# Patient Record
Sex: FEMALE | Race: WHITE | URBAN - METROPOLITAN AREA
[De-identification: names, ages, dates, MRNs, and addresses within clinical notes are randomized per-mention and may not be internally consistent; named-entity substitution may affect disease eponyms.]

---

## 2017-06-21 ENCOUNTER — THERAPY VISIT (OUTPATIENT)
Dept: PHYSICAL THERAPY | Facility: CLINIC | Age: 52
End: 2017-06-21
Payer: COMMERCIAL

## 2017-06-21 DIAGNOSIS — M54.12 CERVICAL RADICULOPATHY: Primary | ICD-10-CM

## 2017-06-21 PROCEDURE — 97110 THERAPEUTIC EXERCISES: CPT | Mod: GP | Performed by: PHYSICAL THERAPIST

## 2017-06-21 PROCEDURE — 97161 PT EVAL LOW COMPLEX 20 MIN: CPT | Mod: GP | Performed by: PHYSICAL THERAPIST

## 2017-06-21 PROCEDURE — 97140 MANUAL THERAPY 1/> REGIONS: CPT | Mod: GP | Performed by: PHYSICAL THERAPIST

## 2017-06-21 NOTE — MR AVS SNAPSHOT
After Visit Summary   6/21/2017    Zulma Stewart    MRN: [id number]           Patient Information     Date Of Birth          1965        Visit Information        Provider Department      6/21/2017 7:30 AM Ralph De Souza PT [facility]        Today's Diagnoses     Cervical radiculopathy    -  1       Follow-ups after your visit        Your next 10 appointments already scheduled     Jun 27, 2017  4:40 PM CDT   JAGDISH Spine with Ralph De Souza PT   [place] ([facility]  )    [address] [place] [address]   [phone]            Jun 29, 2017  7:30 AM CDT   JAGDISH Spine with Ralph De Souza PT   [facility] ([facility]  )    [address] [place] [address]   [phone]            Jul 03, 2017  7:30 AM CDT   JAGDISH Spine with Rosaura Arriaga PT   [facility] ([facility]  )    [address] [place] [address]   [phone]            Jul 06, 2017  8:10 AM CDT   JAGDISH Spine with Rosaura Arriaga PT   [facility] ([facility]  )    [address] [place] [address]   [phone]            Jul 11, 2017  7:30 AM CDT   JAGDISH Spine with Ralph De Souza PT   [facility] ([facility]  )    [address] [place] [address]   [phone]            Jul 13, 2017  7:30 AM CDT   JAGDISH Spine with Ralph De Souza PT   [facility] ([facility]  )    [address] [place] [address]   [phone]            Jul 18, 2017  7:30 AM CDT   JAGDISH Spine with Ralph De Souza PT   [facility] ([facility]  )    [address] [place] [address]  "[facility] [id number] [phone]            2017  7:30 AM CDT   JAGDISH Spine with Ralph De Souza PT   [facility] Therapy ([facility]  )    [address] [facility] [address] [phone]              Who to contact     If you have questions or need follow up information about today's clinic visit or your schedule please [facility] directly at [phone].  Normal or non-critical lab and imaging results will be communicated to you by [employer], letter or phone within 4 business days after the clinic has received the results. If you do not hear from us within 7 days, please contact the clinic through [employer] or phone. If you have a critical or abnormal lab result, we will notify you by phone as soon as possible.  Submit refill requests through [email or web address] or call your pharmacy and they will forward the refill request to us. Please allow 3 business days for your refill to be completed.          Additional Information About Your Visit        MyChart Information     [email or web address] lets you send messages to your doctor, view your test results, renew your prescriptions, schedule appointments and more. To sign up, go to [email or web address] . Click on \"Log in\" on the left side of the screen, which will take you to the Welcome page. Then click on \"Sign up Now\" on the right side of the page.     You will be asked to enter the access code listed below, as well as some personal information. Please follow the directions to create your username and password.     Your access code is: [email or web address]  Expires: 2017  8:36 AM     Your access code will  in 90 days. If you need help or a new code, please call your [facility] clinic or [phone].        Care EveryWhere ID     This is your Care EveryWhere ID. This could be used by other organizations to access your [facility] medical " records  [id number]         Blood Pressure from Last 3 Encounters:   01/14/12 92/58   11/11/11 102/60   05/05/11 116/60    Weight from Last 3 Encounters:   01/14/12 68.2 kg (150 lb 6 oz)   11/11/11 65.9 kg (145 lb 3.2 oz)              We Performed the Following     HC PT EVAL, LOW COMPLEXITY     JAGDISH INITIAL EVAL REPORT     MANUAL THER TECH,1+REGIONS,EA 15 MIN     THERAPEUTIC EXERCISES        Primary Care Provider Office Phone # Fax #    Cassandra Sorto, APRN [phone]       [place] [address]        Equal Access to Services     MARIAM GRAFF : Hadii jaron egan Sopriscila, waaxda luqadaha, qaybta kaalmada adeedwinyasheela, laure gonzalez . So [facility] [phone].    ATENCIÓN: Si habla español, tiene a perez disposición servicios gratuitos de asistencia lingüística. Llame al [phone].    We comply with applicable federal civil rights laws and [place] laws. We do not discriminate on the basis of race, color, national origin, age, disability sex, sexual orientation or gender identity.            Thank you!     Thank you for choosing [facility]ICAL THERAP[facility]  for your care. Our goal is always to provide you with excellent care. Hearing back from our patients is one way we can continue to improve our services. Please take a few minutes to complete the written survey that you may receive in the mail after your visit with us. Thank you!             Your Updated Medication List - Protect others around you: Learn how to safely use, store and throw away your medicines at www.disposemymeds.org.          This list is accurate as of: 6/21/17  8:36 AM.  Always use your most recent med list.                   Brand Name Dispense Instructions for use Diagnosis    aspirin 81 MG EC tablet      1 TABLET DAILY        ciprofloxacin 250 MG tablet    CIPRO    6 tablet    Take 1 tablet by mouth 2 times daily.     UTI (urinary tract infection)       NO ACTIVE MEDICATIONS

## 2017-06-21 NOTE — LETTER
[facility] ATHLETIC [facility] PHYSICAL [facility]  [address] [facility] [address]  [phone]    2017    Re: Zulma Stewart   :   1965  MRN:  [id number]   REFERRING PHYSICIAN:   Vika Carroll    [facility]  Date of Initial Evaluation:    17  Visits:  Rxs Used: 1  Reason for Referral:  Cervical radiculopathy    EVALUATION SUMMARY  [facility] Initial Evaluation  Subjective:  Pt presents to PT with a chief complaint of lower cervical pain with radiation into L UE with reported insidious onset 1 month ago with potential correlation to sleeping in a bad position. Primary pain location identified at lower cervical spine with radiation into L upper trapezius, medial scapula, upper arm and numbness into L thumb. Pain reported to be intermittent and worse with looking up/down, rotating her head, and laying on involved side. Pain reported to be improved with prednisone and NSAIDs. Pt diagnosed with C6 radiculopathy and referred to PT.   Patient is a 52 year old female presenting with rehab cervical spine hpi.   Zulma Stewart is a 52 year old female with a cervical spine condition.  Condition occurred with:  Insidious onset.  Condition occurred: for unknown reasons (potential correlation to poor sleeping posture).  This is a new condition  1 month ago  Patient reports pain:  Lower cervical spine.  Radiates to:  Shoulder left, upper arm left, elbow left, lower arm left and hand left.  Pain is described as sharp and is intermittent and reported as 7/10.  Associated symptoms:  Loss of motion/stiffness, numbness and tingling. Pain is worse in the P.M..  Symptoms are exacerbated by rotating head, looking up or down, lifting, carrying, lying down and sitting and relieved by rest and ice.  Since onset symptoms are gradually improving.    Previous treatment includes physical therapy.  There was moderate  improvement following previous treatment.  General health as reported by patient is good.  Pertinent medical history includes:  Menopausal.  Medical allergies: no.  Other surgeries include:  No.  Current medications:  Anti-inflammatory and steroids.  Current occupation is   .  Patient is working in normal job without restrictions.  Primary job tasks include:  Prolonged sitting.  Barriers include:  None as reported by the patient.  Red flags:  None as reported by the patient.    Objective:  Standing Alignment:    Shoulder/UE:  Rounded shoulders         Re: Zulma Stewart   :   1965    Cervical/Thoracic Evaluation  AROM:  AROM Cervical:  Flexion:            Mod loss, pain ++ into L UE  Extension:       Major loss, pain +++ into L UE  Rotation:         Left: 50 deg, pain ++ into L UE and thumb     Right: 70 deg, pain +  Side Bend:      Left: Mod loss, pain ++ into L UE     Right:  Min loss, pain + stretching L UT  Headaches: none  Cervical Myotomes:    C4 (shrug):  Left: 5      C5 (Deltoid):  Left: 5-    Right: 5  C6 (Biceps):  Left: 5-    Right: 5  C7 (Triceps):  Left: 5    Right: 5  C8 (Thumb Ext): Left: 5    Right: 5  T1 (Intrinsics): Left: 5    Right: 5  DTR's:    C5 (Biceps):  Left:  2  Right:  2     C6 (Brachioradialis):  Left:  2  Right:  2     C7 (Triceps):  Left:  2  Right:  2  Neural Tension:    Left side:  Median positive.  Cervical Dermatomes:    C6 left:  Hypo-light touch     C6 right:  Normal-light touch    Cervical Palpation:    Tenderness present at Left:    Upper Trap and Levator  Cervical Stability/Joint Clearing:    Negative:ALAR Ligament and TLA AP  Spinal Segmental Conclusions:    Level:  Hypo at T4, T3, C5 and C6  Cord Sign:    Cord sign negative for:  Cevallos left or Cevallos right      Assessment/Plan:    Patient is a 52 year old female with cervical complaints.    Patient has the following significant findings with corresponding treatment plan.                Diagnosis 1:   Cervical Radiculopathy    Pain -  hot/cold therapy, manual therapy, splint/taping/bracing/orthotics, self management, education and directional preference exercise  Decreased ROM/flexibility - manual therapy and therapeutic exercise  Decreased joint mobility - manual therapy and therapeutic exercise  Decreased strength - therapeutic exercise and therapeutic activities  Impaired muscle performance - neuro re-education  Impaired posture - neuro re-education        Re: Zulma Stewart   :   1965    Therapy Evaluation Codes:   1) History comprised of:   Personal factors that impact the plan of care:      None.    Comorbidity factors that impact the plan of care are:      None.     Medications impacting care: None.  2) Examination of Body Systems comprised of:   Body structures and functions that impact the plan of care:      Cervical spine and Shoulder.   Activity limitations that impact the plan of care are:      Bathing, Driving, Dressing, Lifting, Reading/Computer work, Sitting and Laying down.  3) Clinical presentation characteristics are:   Stable/Uncomplicated.  4) Decision-Making    Moderate complexity using standardized patient assessment instrument and/or measureable assessment of functional outcome.  Cumulative Therapy Evaluation is: Low complexity.  Previous and current functional limitations:  (See Goal Flow Sheet for this information)    Short term and Long term goals: (See Goal Flow Sheet for this information)   Communication ability:  Patient appears to be able to clearly communicate and understand verbal and written communication and follow directions correctly.  Treatment Explanation - The following has been discussed with the patient:   RX ordered/plan of care  Anticipated outcomes  Possible risks and side effects  This patient would benefit from PT intervention to resume normal activities.   Rehab potential is good.    Frequency:  2 X week, once daily  Duration:  for 3 weeks tapering to 1 x week  for 3 weeks  Discharge Plan:  Achieve all LTG.  Independent in home treatment program.  Reach maximal therapeutic benefit.    Thank you for your referral.    INQUIRIES  Therapist:   Ralph De Souza   [facility]  [address] [place] [address]  Phone: [phone]  Fax: [phone]

## 2017-06-21 NOTE — PROGRESS NOTES
[facility] Initial Evaluation    Subjective:  Pt presents to PT with a chief complaint of lower cervical pain with radiation into L UE with reported insidious onset 1 month ago with potential correlation to sleeping in a bad position. Primary pain location identified at lower cervical spine with radiation into L upper trapezius, medial scapula, upper arm and numbness into L thumb. Pain reported to be intermittent and worse with looking up/down, rotating her head, and laying on involved side. Pain reported to be improved with prednisone and NSAIDs. Pt diagnosed with C6 radiculopathy and referred to PT.   Patient is a 52 year old female presenting with rehab cervical spine hpi.   Zulma Stewart is a 52 year old female with a cervical spine condition.  Condition occurred with:  Insidious onset.  Condition occurred: for unknown reasons (potential correlation to poor sleeping posture).  This is a new condition  1 month ago  .    Patient reports pain:  Lower cervical spine.  Radiates to:  Shoulder left, upper arm left, elbow left, lower arm left and hand left.  Pain is described as sharp and is intermittent and reported as 7/10.  Associated symptoms:  Loss of motion/stiffness, numbness and tingling. Pain is worse in the P.M..  Symptoms are exacerbated by rotating head, looking up or down, lifting, carrying, lying down and sitting and relieved by rest and ice.  Since onset symptoms are gradually improving.    Previous treatment includes physical therapy.  There was moderate improvement following previous treatment.  General health as reported by patient is good.  Pertinent medical history includes:  Menopausal.  Medical allergies: no.  Other surgeries include:  No.  Current medications:  Anti-inflammatory and steroids.  Current occupation is   .  Patient is working in normal job without restrictions.  Primary job tasks include:  Prolonged sitting.    Barriers include:  None as reported by the  patient.    Red flags:  None as reported by the patient.                        Objective:    Standing Alignment:      Shoulder/UE:  Rounded shoulders                                  Cervical/Thoracic Evaluation    AROM:  AROM Cervical:    Flexion:            Mod loss, pain ++ into L UE  Extension:       Major loss, pain +++ into L UE  Rotation:         Left: 50 deg, pain ++ into L UE and thumb     Right: 70 deg, pain +  Side Bend:      Left: Mod loss, pain ++ into L UE     Right:  Min loss, pain + stretching L UT      Headaches: none  Cervical Myotomes:        C4 (shrug):  Left: 5      C5 (Deltoid):  Left: 5-    Right: 5  C6 (Biceps):  Left: 5-    Right: 5  C7 (Triceps):  Left: 5    Right: 5  C8 (Thumb Ext): Left: 5    Right: 5  T1 (Intrinsics): Left: 5    Right: 5  DTR's:    C5 (Biceps):  Left:  2  Right:  2     C6 (Brachioradialis):  Left:  2  Right:  2     C7 (Triceps):  Left:  2  Right:  2  Neural Tension:    Left side:  Median positive.    Cervical Dermatomes:              C6 left:  Hypo-light touch     C6 right:  Normal-light touch          Cervical Palpation:    Tenderness present at Left:    Upper Trap and Levator      Cervical Stability/Joint Clearing:        Negative:ALAR Ligament and TLA AP  Spinal Segmental Conclusions:    Level:  Hypo at T4, T3, C5 and C6      Cord Sign:      Cord sign negative for:  Cevallos left or Cevallos right                                          General     ROS    Assessment/Plan:      Patient is a 52 year old female with cervical complaints.    Patient has the following significant findings with corresponding treatment plan.                Diagnosis 1:  Cervical Radiculopathy    Pain -  hot/cold therapy, manual therapy, splint/taping/bracing/orthotics, self management, education and directional preference exercise  Decreased ROM/flexibility - manual therapy and therapeutic exercise  Decreased joint mobility - manual therapy and therapeutic exercise  Decreased strength -  therapeutic exercise and therapeutic activities  Impaired muscle performance - neuro re-education  Impaired posture - neuro re-education    Therapy Evaluation Codes:   1) History comprised of:   Personal factors that impact the plan of care:      None.    Comorbidity factors that impact the plan of care are:      None.     Medications impacting care: None.  2) Examination of Body Systems comprised of:   Body structures and functions that impact the plan of care:      Cervical spine and Shoulder.   Activity limitations that impact the plan of care are:      Bathing, Driving, Dressing, Lifting, Reading/Computer work, Sitting and Laying down.  3) Clinical presentation characteristics are:   Stable/Uncomplicated.  4) Decision-Making    Moderate complexity using standardized patient assessment instrument and/or measureable assessment of functional outcome.  Cumulative Therapy Evaluation is: Low complexity.    Previous and current functional limitations:  (See Goal Flow Sheet for this information)    Short term and Long term goals: (See Goal Flow Sheet for this information)     Communication ability:  Patient appears to be able to clearly communicate and understand verbal and written communication and follow directions correctly.  Treatment Explanation - The following has been discussed with the patient:   RX ordered/plan of care  Anticipated outcomes  Possible risks and side effects  This patient would benefit from PT intervention to resume normal activities.   Rehab potential is good.    Frequency:  2 X week, once daily  Duration:  for 3 weeks tapering to 1 x week for 3 weeks  Discharge Plan:  Achieve all LTG.  Independent in home treatment program.  Reach maximal therapeutic benefit.    Please refer to the daily flowsheet for treatment today, total treatment time and time spent performing 1:1 timed codes.

## 2017-06-27 ENCOUNTER — THERAPY VISIT (OUTPATIENT)
Dept: PHYSICAL THERAPY | Facility: CLINIC | Age: 52
End: 2017-06-27
Payer: COMMERCIAL

## 2017-06-27 DIAGNOSIS — M54.12 CERVICAL RADICULOPATHY: ICD-10-CM

## 2017-06-27 PROCEDURE — 97112 NEUROMUSCULAR REEDUCATION: CPT | Mod: GP | Performed by: PHYSICAL THERAPIST

## 2017-06-27 PROCEDURE — 97110 THERAPEUTIC EXERCISES: CPT | Mod: GP | Performed by: PHYSICAL THERAPIST

## 2017-06-27 PROCEDURE — 97140 MANUAL THERAPY 1/> REGIONS: CPT | Mod: GP | Performed by: PHYSICAL THERAPIST

## 2017-06-29 ENCOUNTER — THERAPY VISIT (OUTPATIENT)
Dept: PHYSICAL THERAPY | Facility: CLINIC | Age: 52
End: 2017-06-29
Payer: COMMERCIAL

## 2017-06-29 DIAGNOSIS — M54.12 CERVICAL RADICULOPATHY: ICD-10-CM

## 2017-06-29 PROCEDURE — 97012 MECHANICAL TRACTION THERAPY: CPT | Mod: GP | Performed by: PHYSICAL THERAPIST

## 2017-06-29 PROCEDURE — 97140 MANUAL THERAPY 1/> REGIONS: CPT | Mod: GP | Performed by: PHYSICAL THERAPIST

## 2017-06-29 PROCEDURE — 97110 THERAPEUTIC EXERCISES: CPT | Mod: GP | Performed by: PHYSICAL THERAPIST

## 2017-07-01 ENCOUNTER — HEALTH MAINTENANCE LETTER (OUTPATIENT)
Age: 52
End: 2017-07-01

## 2017-07-03 ENCOUNTER — THERAPY VISIT (OUTPATIENT)
Dept: PHYSICAL THERAPY | Facility: CLINIC | Age: 52
End: 2017-07-03
Payer: COMMERCIAL

## 2017-07-03 DIAGNOSIS — M54.12 CERVICAL RADICULOPATHY: ICD-10-CM

## 2017-07-03 PROCEDURE — 97110 THERAPEUTIC EXERCISES: CPT | Mod: GP | Performed by: PHYSICAL THERAPIST

## 2017-07-03 PROCEDURE — 97140 MANUAL THERAPY 1/> REGIONS: CPT | Mod: GP | Performed by: PHYSICAL THERAPIST

## 2017-07-11 ENCOUNTER — THERAPY VISIT (OUTPATIENT)
Dept: PHYSICAL THERAPY | Facility: CLINIC | Age: 52
End: 2017-07-11
Payer: COMMERCIAL

## 2017-07-11 DIAGNOSIS — M54.12 CERVICAL RADICULOPATHY: ICD-10-CM

## 2017-07-11 PROCEDURE — 97140 MANUAL THERAPY 1/> REGIONS: CPT | Mod: GP | Performed by: PHYSICAL THERAPIST

## 2017-07-11 PROCEDURE — 97110 THERAPEUTIC EXERCISES: CPT | Mod: GP | Performed by: PHYSICAL THERAPIST

## 2017-07-14 ENCOUNTER — THERAPY VISIT (OUTPATIENT)
Dept: PHYSICAL THERAPY | Facility: CLINIC | Age: 52
End: 2017-07-14
Payer: COMMERCIAL

## 2017-07-14 DIAGNOSIS — M54.12 CERVICAL RADICULOPATHY: ICD-10-CM

## 2017-07-14 PROCEDURE — 97110 THERAPEUTIC EXERCISES: CPT | Mod: GP | Performed by: PHYSICAL THERAPIST

## 2017-07-14 PROCEDURE — 97140 MANUAL THERAPY 1/> REGIONS: CPT | Mod: GP | Performed by: PHYSICAL THERAPIST

## 2017-07-17 ENCOUNTER — THERAPY VISIT (OUTPATIENT)
Dept: PHYSICAL THERAPY | Facility: CLINIC | Age: 52
End: 2017-07-17
Payer: COMMERCIAL

## 2017-07-17 DIAGNOSIS — M54.12 CERVICAL RADICULOPATHY: ICD-10-CM

## 2017-07-17 PROCEDURE — 97140 MANUAL THERAPY 1/> REGIONS: CPT | Mod: GP | Performed by: PHYSICAL THERAPIST

## 2017-07-17 PROCEDURE — 97110 THERAPEUTIC EXERCISES: CPT | Mod: GP | Performed by: PHYSICAL THERAPIST

## 2017-07-26 ENCOUNTER — THERAPY VISIT (OUTPATIENT)
Dept: PHYSICAL THERAPY | Facility: CLINIC | Age: 52
End: 2017-07-26
Payer: COMMERCIAL

## 2017-07-26 DIAGNOSIS — M54.12 CERVICAL RADICULOPATHY: ICD-10-CM

## 2017-07-26 PROCEDURE — 97110 THERAPEUTIC EXERCISES: CPT | Mod: GP | Performed by: PHYSICAL THERAPIST

## 2017-07-26 PROCEDURE — 97112 NEUROMUSCULAR REEDUCATION: CPT | Mod: GP | Performed by: PHYSICAL THERAPIST

## 2017-08-08 ENCOUNTER — THERAPY VISIT (OUTPATIENT)
Dept: PHYSICAL THERAPY | Facility: CLINIC | Age: 52
End: 2017-08-08
Payer: COMMERCIAL

## 2017-08-08 DIAGNOSIS — M25.612 SHOULDER STIFFNESS, LEFT: Primary | ICD-10-CM

## 2017-08-08 DIAGNOSIS — M54.12 CERVICAL RADICULOPATHY: ICD-10-CM

## 2017-08-08 PROCEDURE — 97110 THERAPEUTIC EXERCISES: CPT | Mod: GP | Performed by: PHYSICAL THERAPIST

## 2017-08-08 PROCEDURE — 97140 MANUAL THERAPY 1/> REGIONS: CPT | Mod: GP | Performed by: PHYSICAL THERAPIST

## 2017-08-08 NOTE — PROGRESS NOTES
Subjective:    HPI                    Objective:    System    Physical Exam    General     ROS    Assessment/Plan:      PROGRESS  REPORT    Progress reporting period is from 6/21/17 to 8/8/17.     SUBJECTIVE  Subjective: Some lower neck pain with progression of ret, shoulder feels fine unless she moves in certain directions will experience sharp/deep shoulder pain.       Initial Pain level: 6/10   Changes in function: Yes, see goal flow sheet for change in function   Adverse reactions:  new onset L shoulder stiffness, consistent with possible adhesive capsulitis   The objective findings are from DOS 8/8/17.    OBJECTIVE  Objective: L shoulder capsular pattern of tightness and pain. End range stiffness flex and ABD, major loss ER. Pt reports inc pain with Flex/ER/abd type movements. Improves with MT. C spine AROM WNL w/o referred pain.      ASSESSMENT/PLAN  Updated problem list and treatment plan: Diagnosis 1:  Cervical radiculopathy, new onset shoulder stiffness  Pain -  manual therapy, self management, education, directional preference exercise and home program  Decreased ROM/flexibility - manual therapy, therapeutic exercise and home program  Decreased joint mobility - manual therapy, therapeutic exercise and home program  Decreased proprioception - neuro re-education, therapeutic activities and home program  Impaired posture - neuro re-education, therapeutic activities and home program  STG/LTGs have been met or progress has been made towards goals:  Yes (See Goal flow sheet completed today.)  Assessment of Progress: The patient's condition is improving.  The patient's condition has potential to improve.  The patient has had set backs in their progress.  Self Management Plans:  Patient has been instructed in a home treatment program.  Patient is independent in a home treatment program.  Patient  has been instructed in self management of symptoms.  Patient is independent in self management of symptoms.  I have  re-evaluated this patient and find that the nature, scope, duration and intensity of the therapy is appropriate for the medical condition of the patient.  Zulma continues to require the following intervention to meet STG and LTG's: PT  Please send updated orders for skilled physical therapy     Recommendations:  This patient would benefit from continued therapy.     Frequency:  1 X week, once daily  Duration:  for 6 weeks to address new onset shoulder stiffness and pain          Please refer to the daily flowsheet for treatment today, total treatment time and time spent performing 1:1 timed codes.

## 2017-08-08 NOTE — LETTER
[facility] ATHLETIC [facility]  [address] [facility] [address]  [phone]    2017    Re: Zulma Stewart   :   1965  MRN:  [id number]   REFERRING PHYSICIAN:   Vika Carroll    [facility]THLET[facility]    Date of Initial Evaluation:  2017  Visits:  9  Rxs Used: 9  Reason for Referral:     Cervical radiculopathy  Shoulder stiffness, left    PROGRESS  REPORT    Progress reporting period is from 17 to 17.     SUBJECTIVE  Subjective: Some lower neck pain with progression of ret, shoulder feels fine unless she moves in certain directions will experience sharp/deep shoulder pain.       Initial Pain level: 6/10   Changes in function: Yes, see goal flow sheet for change in function   Adverse reactions:  new onset L shoulder stiffness, consistent with possible adhesive capsulitis   The objective findings are from DOS 17.    OBJECTIVE  Objective: L shoulder capsular pattern of tightness and pain. End range stiffness flex and ABD, major loss ER. Pt reports inc pain with Flex/ER/abd type movements. Improves with MT. C spine AROM WNL w/o referred pain.      ASSESSMENT/PLAN  Updated problem list and treatment plan: Diagnosis 1:  Cervical radiculopathy, new onset shoulder stiffness  Pain -  manual therapy, self management, education, directional preference exercise and home program  Decreased ROM/flexibility - manual therapy, therapeutic exercise and home program  Decreased joint mobility - manual therapy, therapeutic exercise and home program  Decreased proprioception - neuro re-education, therapeutic activities and home program  Impaired posture - neuro re-education, therapeutic activities and home program  Re: Zulma Stewart   :   1965    STG/LTGs have been met or progress has been made towards goals:  Yes (See Goal flow sheet completed today.)  Assessment of Progress: The patient's condition is  improving.  The patient's condition has potential to improve.  The patient has had set backs in their progress.  Self Management Plans:  Patient has been instructed in a home treatment program.  Patient is independent in a home treatment program.  Patient  has been instructed in self management of symptoms.  Patient is independent in self management of symptoms.  I have re-evaluated this patient and find that the nature, scope, duration and intensity of the therapy is appropriate for the medical condition of the patient.  Zulma continues to require the following intervention to meet STG and LTG's: PT  Please send updated orders for skilled physical therapy     Recommendations:  This patient would benefit from continued therapy.     Frequency:  1 X week, once daily  Duration:  for 6 weeks to address new onset shoulder stiffness and pain                Thank you for your referral.    INQUIRIES  Therapist: Rosaura Arriaga PT  [facility] PH[facility]ICAL THERAP[facility]  [address] [place] [address]  Phone: [phone]  Fax: [phone]

## 2017-08-08 NOTE — MR AVS SNAPSHOT
After Visit Summary   8/8/2017    Zulma Stewart    MRN: [id number]           Patient Information     Date Of Birth          1965        Visit Information        Provider Department      8/8/2017 10:50 AM Rosaura Arriaga, MOHSEN [facility] Physical Therapy        Today's Diagnoses     Shoulder stiffness, left    -  1    Cervical radiculopathy           Follow-ups after your visit        Your next 10 appointments already scheduled     Aug 14, 2017  9:10 AM CDT   JAGDISH Spine with Karyn Kuhn ATC   [facility] Physical Therapy ([facility]  )    [address] [place] [id number]   [phone]            Aug 25, 2017 11:30 AM CDT   JAGDISH Spine with Rosaura Arriaga PT   [facility] Therapy ([facility]  )    [address] [place] [address]   [phone]            Sep 01, 2017  9:30 AM CDT   JAGDISH Spine with Rosaura Arriaga PT   [facility] Therapy ([facility]  )    [address] [place] [address]   [phone]            Sep 08, 2017  9:30 AM CDT   JAGDISH Spine with Rosaura Arriaga PT   [facility] Physical Therapy ([facility]  )    [address] [place] [id number] [phone]              Who to contact     If you have questions or need follow up information about today's clinic visit or your schedule please contact [facility] PHYSICAL THERAPY directly at [phone].  Normal or non-critical lab and imaging results will be communicated to you by MyChart, letter or phone within 4 business days after the clinic has received the results. If you do not hear from us within 7 days, please contact the clinic through MyChart or phone. If you have a critical or abnormal lab result, we will notify you by phone as soon as  "possible.  Submit refill requests through [employer] or call your pharmacy and they will forward the refill request to us. Please allow 3 business days for your refill to be completed.          Additional Information About Your Visit        [employer] Information     [employer] lets you send messages to your doctor, view your test results, renew your prescriptions, schedule appointments and more. To sign up, go to [email or web address] . Click on \"Log in\" on the left side of the screen, which will take you to the Welcome page. Then click on \"Sign up Now\" on the right side of the page.     You will be asked to enter the access code listed below, as well as some personal information. Please follow the directions to create your username and password.     Your access code is: [email or web address]  Expires: 2017  8:36 AM     Your access code will  in 90 days. If you need help or a new code, please call your [facility] clinic or [phone].        Care EveryWhere ID     This is your Care EveryWhere ID. This could be used by other organizations to access your [facility] medical records  [id number]         Blood Pressure from Last 3 Encounters:   12 92/58   11 102/60   11 116/60    Weight from Last 3 Encounters:   12 68.2 kg (150 lb 6 oz)   11 65.9 kg (145 lb 3.2 oz)              We Performed the Following     JAGDISH Progress Notes Report     Manual Ther Tech, 1+Regions, EA 15 min     Therapeutic Exercises        Primary Care Provider Office Phone # Fax #    Cassandra HOLLY [facility] [phone]       [facility] [address] [place] [phone]        Equal Access to Services     [facility] PRERNA : Hadii jaron Chavez, warosa mda luqadaha, qaybta kaalmada mejia, laure sánchez. So [facility] [phone].    ATENCIÓN: Si habla español, tiene a perez disposición servicios gratuitos de asistencia lingüística. Llame al [phone].    We " comply with applicable federal civil rights laws and [place] laws. We do not discriminate on the basis of race, color, national origin, age, disability sex, sexual orientation or gender identity.            Thank you!     Thank you for choosing [facility] PHYSICAL [facility]  for your care. Our goal is always to provide you with excellent care. Hearing back from our patients is one way we can continue to improve our services. Please take a few minutes to complete the written survey that you may receive in the mail after your visit with us. Thank you!             Your Updated Medication List - Protect others around you: Learn how to safely use, store and throw away your medicines at www.disposemymeds.org.          This list is accurate as of: 8/8/17  1:12 PM.  Always use your most recent med list.                   Brand Name Dispense Instructions for use Diagnosis    aspirin 81 MG EC tablet      1 TABLET DAILY        ciprofloxacin 250 MG tablet    CIPRO    6 tablet    Take 1 tablet by mouth 2 times daily.    UTI (urinary tract infection)       NO ACTIVE MEDICATIONS

## 2017-08-25 ENCOUNTER — THERAPY VISIT (OUTPATIENT)
Dept: PHYSICAL THERAPY | Facility: CLINIC | Age: 52
End: 2017-08-25
Payer: COMMERCIAL

## 2017-08-25 DIAGNOSIS — M25.612 SHOULDER STIFFNESS, LEFT: ICD-10-CM

## 2017-08-25 DIAGNOSIS — M54.12 CERVICAL RADICULOPATHY: ICD-10-CM

## 2017-08-25 PROCEDURE — 97110 THERAPEUTIC EXERCISES: CPT | Mod: GP | Performed by: PHYSICAL THERAPIST

## 2017-08-25 PROCEDURE — 97140 MANUAL THERAPY 1/> REGIONS: CPT | Mod: GP | Performed by: PHYSICAL THERAPIST

## 2017-08-25 PROCEDURE — 97112 NEUROMUSCULAR REEDUCATION: CPT | Mod: GP | Performed by: PHYSICAL THERAPIST
